# Patient Record
Sex: FEMALE | Race: WHITE
[De-identification: names, ages, dates, MRNs, and addresses within clinical notes are randomized per-mention and may not be internally consistent; named-entity substitution may affect disease eponyms.]

---

## 2017-11-20 ENCOUNTER — HOSPITAL ENCOUNTER (EMERGENCY)
Dept: HOSPITAL 47 - EC | Age: 22
Discharge: HOME | End: 2017-11-20
Payer: COMMERCIAL

## 2017-11-20 VITALS — DIASTOLIC BLOOD PRESSURE: 58 MMHG | HEART RATE: 75 BPM | SYSTOLIC BLOOD PRESSURE: 105 MMHG

## 2017-11-20 VITALS — RESPIRATION RATE: 18 BRPM | TEMPERATURE: 97.2 F

## 2017-11-20 DIAGNOSIS — Z79.899: ICD-10-CM

## 2017-11-20 DIAGNOSIS — G40.909: Primary | ICD-10-CM

## 2017-11-20 DIAGNOSIS — Z88.5: ICD-10-CM

## 2017-11-20 LAB
ANION GAP SERPL CALC-SCNC: 8 MMOL/L
BASOPHILS # BLD AUTO: 0 K/UL (ref 0–0.2)
BASOPHILS NFR BLD AUTO: 0 %
BUN SERPL-SCNC: 12 MG/DL (ref 7–17)
CALCIUM SPEC-MCNC: 9.5 MG/DL (ref 8.4–10.2)
CH: 32.2
CHCM: 33
CHLORIDE SERPL-SCNC: 108 MMOL/L (ref 98–107)
CO2 SERPL-SCNC: 21 MMOL/L (ref 22–30)
EOSINOPHIL # BLD AUTO: 0.2 K/UL (ref 0–0.7)
EOSINOPHIL NFR BLD AUTO: 2 %
ERYTHROCYTE [DISTWIDTH] IN BLOOD BY AUTOMATED COUNT: 3.72 M/UL (ref 3.8–5.4)
ERYTHROCYTE [DISTWIDTH] IN BLOOD: 11.8 % (ref 11.5–15.5)
GLUCOSE SERPL-MCNC: 72 MG/DL (ref 74–99)
HCT VFR BLD AUTO: 36.4 % (ref 34–46)
HDW: 2.21
HGB BLD-MCNC: 12.2 GM/DL (ref 11.4–16)
LUC NFR BLD AUTO: 1 %
LYMPHOCYTES # SPEC AUTO: 1.2 K/UL (ref 1–4.8)
LYMPHOCYTES NFR SPEC AUTO: 17 %
MCH RBC QN AUTO: 32.9 PG (ref 25–35)
MCHC RBC AUTO-ENTMCNC: 33.6 G/DL (ref 31–37)
MCV RBC AUTO: 98 FL (ref 80–100)
MONOCYTES # BLD AUTO: 0.5 K/UL (ref 0–1)
MONOCYTES NFR BLD AUTO: 6 %
NEUTROPHILS # BLD AUTO: 5.3 K/UL (ref 1.3–7.7)
NEUTROPHILS NFR BLD AUTO: 73 %
NON-AFRICAN AMERICAN GFR(MDRD): >60
PARTICLE COUNT: (no result)
PH UR: 7.5 [PH] (ref 5–8)
POTASSIUM SERPL-SCNC: 4.4 MMOL/L (ref 3.5–5.1)
SODIUM SERPL-SCNC: 137 MMOL/L (ref 137–145)
SP GR UR: 1.02 (ref 1–1.03)
SQUAMOUS UR QL AUTO: 1 /HPF (ref 0–4)
UA BILLING (MACRO VS. MICRO): (no result)
UROBILINOGEN UR QL STRIP: <2 MG/DL (ref ?–2)
WBC # BLD AUTO: 0.08 10*3/UL
WBC # BLD AUTO: 7.3 K/UL (ref 4–11)
WBC #/AREA URNS HPF: 2 /HPF (ref 0–5)
WBC (PEROX): 7.65

## 2017-11-20 PROCEDURE — 36415 COLL VENOUS BLD VENIPUNCTURE: CPT

## 2017-11-20 PROCEDURE — 51701 INSERT BLADDER CATHETER: CPT

## 2017-11-20 PROCEDURE — 71010: CPT

## 2017-11-20 PROCEDURE — 85025 COMPLETE CBC W/AUTO DIFF WBC: CPT

## 2017-11-20 PROCEDURE — 80048 BASIC METABOLIC PNL TOTAL CA: CPT

## 2017-11-20 PROCEDURE — 99284 EMERGENCY DEPT VISIT MOD MDM: CPT

## 2017-11-20 PROCEDURE — 81001 URINALYSIS AUTO W/SCOPE: CPT

## 2017-11-20 NOTE — ED
Seizure HPI





- General


Chief Complaint: Seizure


Stated Complaint: seizure


Time Seen by Provider: 11/20/17 21:34


Source: patient, EMS


Mode of arrival: EMS


Limitations: language barrier, altered mental status, physical limitation





- History of Present Illness


Initial Comments: 


patient is a 22-year-old female with a history of cerebral palsy and epilepsy 

who presents with a chief complaint of seizure.  The patient arrived via EMS 

with her mother.  Her mother's her primary caretaker.  The mother states that 

her and her  recently went hunting and the left by daughter with her 

aunt for respite.  They took her home today, and as she came in she began 

having a seizure.  Mother states that the patient had 2 seizures, total last 

about 6 minutes.  The patient was given rectal diazepam from the mother.  She 

was transported to the emergency department by EMS without further seizure 

activity.  Per the mother, the patient missed any doses of her medications 

which are Klonopin and Trileptal.  She has not been having any other complaints 

suggest an infectious etiology.  Mother does state however that when she went 

from home the patient does not sleep as well.








- Related Data


 Home Medications











 Medication  Instructions  Recorded  Confirmed


 


Diazepam [Diastat] 10 mg RECTAL ONCE PRN 11/20/17 11/20/17


 


OXcarbazepine 300MG/5ML SUSP 420 mg PEG/G-TUBE BID 11/20/17 11/20/17





[Trileptal Oral Susp]   


 


Prevacid Solutab 15mg 15 mg PEG/G-TUBE HS 11/20/17 11/20/17


 


Prevacid Solutab 15mg 30 mg PO AC-BRKFST 11/20/17 11/20/17


 


clonazePAM [Klonopin ODT Wafer] 0.25 mg PEG/G-TUBE BID@0700,1500 11/20/17 11/20/ 17


 


clonazePAM [Klonopin ODT Wafer] 0.5 mg PEG/G-TUBE HS 11/20/17 11/20/17











 Allergies











Allergy/AdvReac Type Severity Reaction Status Date / Time


 


morphine Allergy  Rash/Hives Verified 11/20/17 22:11














Review of Systems


ROS Statement: 


Those systems with pertinent positive or pertinent negative responses have been 

documented in the HPI.





ROS Other: All systems not noted in ROS Statement are negative.


Constitutional: Denies: fever


Respiratory: Denies: cough


Gastrointestinal: Denies: nausea, vomiting, diarrhea, constipation


Genitourinary: Denies: dysuria


Skin: Denies: rash, lesions





Past Medical History


Additional Past Medical History / Comment(s): cerebal palsy, epilepsy


History of Any Multi-Drug Resistant Organisms: None Reported


Additional Past Surgical History / Comment(s): muscle biopsies, peg tube


Past Psychological History: No Psychological Hx Reported


Smoking Status: Never smoker


Past Alcohol Use History: None Reported


Past Drug Use History: None Reported





General Exam


Limitations: language barrier, altered mental status, physical limitation


General appearance: alert, in no apparent distress





Course


 Vital Signs











  11/20/17





  21:23


 


Temperature 97.2 F L


 


Pulse Rate 114 H


 


Respiratory 18





Rate 


 


Blood Pressure 122/68


 


O2 Sat by Pulse 91 L





Oximetry 














Medical Decision Making





- Medical Decision Making


patient is a 22-year-old female with history of cerebral palsy and epilepsy who 

presents chief complaint of seizures.  As per the mother, the seizure lasted 

about 6 minutes, she got rectal diazepam and has since returned to baseline.  

There is no history of medical noncompliance, and no indication of infection.  

Patient has been out of her home environment for the past few days and has 

reportedly not slept well.  Basic labs, urine, and chest x-ray were ordered and 

reviewed.  Chest x-ray shows no acute process.  Urine does not show concern for 

infection.  Labwork is grossly unremarkable.  At this time, reviewed the 

results with the father who was in the room.  Patient has been stable in the 

emergency department.  They are instructed to follow with primary care and 

neurology.  At this time, patient is stable for discharge.








- Lab Data


Result diagrams: 


 11/20/17 21:48





 11/20/17 21:48


 Lab Results











  11/20/17 11/20/17 11/20/17 Range/Units





  21:48 21:48 22:00 


 


WBC   7.3   (4.0-11.0)  k/uL


 


RBC   3.72 L   (3.80-5.40)  m/uL


 


Hgb   12.2   (11.4-16.0)  gm/dL


 


Hct   36.4   (34.0-46.0)  %


 


MCV   98.0   (80.0-100.0)  fL


 


MCH   32.9   (25.0-35.0)  pg


 


MCHC   33.6   (31.0-37.0)  g/dL


 


RDW   11.8   (11.5-15.5)  %


 


Plt Count   201   (150-450)  k/uL


 


Neutrophils %   73   %


 


Lymphocytes %   17   %


 


Monocytes %   6   %


 


Eosinophils %   2   %


 


Basophils %   0   %


 


Neutrophils #   5.3   (1.3-7.7)  k/uL


 


Lymphocytes #   1.2   (1.0-4.8)  k/uL


 


Monocytes #   0.5   (0-1.0)  k/uL


 


Eosinophils #   0.2   (0-0.7)  k/uL


 


Basophils #   0.0   (0-0.2)  k/uL


 


Sodium  137    (137-145)  mmol/L


 


Potassium  4.4    (3.5-5.1)  mmol/L


 


Chloride  108 H    ()  mmol/L


 


Carbon Dioxide  21 L    (22-30)  mmol/L


 


Anion Gap  8    mmol/L


 


BUN  12    (7-17)  mg/dL


 


Creatinine  0.37 L    (0.52-1.04)  mg/dL


 


Est GFR (MDRD) Af Amer  >60    (>60 ml/min/1.73 sqM)  


 


Est GFR (MDRD) Non-Af  >60    (>60 ml/min/1.73 sqM)  


 


Glucose  72 L    (74-99)  mg/dL


 


Calcium  9.5    (8.4-10.2)  mg/dL


 


Urine Color    Yellow  


 


Urine Appearance    Turbid H  (Clear)  


 


Urine pH    7.5  (5.0-8.0)  


 


Ur Specific Gravity    1.017  (1.001-1.035)  


 


Urine Protein    Trace H  (Negative)  


 


Urine Glucose (UA)    Negative  (Negative)  


 


Urine Ketones    Negative  (Negative)  


 


Urine Blood    Negative  (Negative)  


 


Urine Nitrite    Negative  (Negative)  


 


Urine Bilirubin    Negative  (Negative)  


 


Urine Urobilinogen    <2.0  (<2.0)  mg/dL


 


Ur Leukocyte Esterase    Negative  (Negative)  


 


Urine WBC    2  (0-5)  /hpf


 


Ur Squamous Epith Cells    1  (0-4)  /hpf


 


Urine Bacteria    Many H  (None)  /hpf


 


Urine Yeast (Budding)    Few H  (None)  /hpf














Disposition


Clinical Impression: 


 Epileptic seizure





Disposition: HOME SELF-CARE


Condition: Good


Instructions:  Recurrent Seizures in Adults (ED)


Referrals: 


Carmen Gibbs MD [Primary Care Provider] - 1-2 days

## 2017-11-20 NOTE — XR
EXAMINATION TYPE: XR chest 1V

 

DATE OF EXAM: 11/20/2017

 

COMPARISON: 2/27/2013

 

HISTORY: Seizure. Chest pain

 

TECHNIQUE: Single frontal view of the chest is obtained.

 

FINDINGS:  There is no heart failure nor confluent pneumonic infiltrate. Costophrenic angles are nato
r. Bony thorax appears intact.

 

IMPRESSION:  No active cardiopulmonary disease. No change.

## 2018-12-03 ENCOUNTER — HOSPITAL ENCOUNTER (OUTPATIENT)
Dept: HOSPITAL 47 - RADXRYALE | Age: 23
End: 2018-12-03
Attending: INTERNAL MEDICINE
Payer: COMMERCIAL

## 2018-12-03 DIAGNOSIS — R05: Primary | ICD-10-CM

## 2018-12-03 PROCEDURE — 71046 X-RAY EXAM CHEST 2 VIEWS: CPT

## 2018-12-03 NOTE — XR
EXAMINATION TYPE: XR chest 2V

 

DATE OF EXAM: 12/3/2018

 

COMPARISON: Prior chest x-ray 11/20/2017

 

HISTORY: Cough

 

TECHNIQUE:  Frontal and lateral views of the chest are obtained.

 

FINDINGS:  Lung volumes are low. Cardiac mediastinal silhouette, pulmonary vascularity and tarik are s
table. No evident pneumothorax or pleural effusion. There is bronchial wall thickening present. No de
finite airspace disease.

 

IMPRESSION:  Expiratory exam. Correlate for bronchitis, reactive airways disease, follow-up as indica
wallace

## 2019-01-28 ENCOUNTER — HOSPITAL ENCOUNTER (OUTPATIENT)
Dept: HOSPITAL 47 - LABWHC1 | Age: 24
Discharge: HOME | End: 2019-01-28
Attending: PSYCHIATRY & NEUROLOGY
Payer: COMMERCIAL

## 2019-01-28 DIAGNOSIS — G40.909: ICD-10-CM

## 2019-01-28 DIAGNOSIS — G80.3: Primary | ICD-10-CM

## 2019-01-28 LAB
ALBUMIN SERPL-MCNC: 4.4 G/DL (ref 3.8–4.9)
ALBUMIN/GLOB SERPL: 1.69 G/DL (ref 1.2–2.1)
ALP SERPL-CCNC: 91 U/L (ref 41–126)
ALT SERPL-CCNC: 18 U/L (ref 8–44)
ANION GAP SERPL CALC-SCNC: 9.8 MMOL/L (ref 4–12)
AST SERPL-CCNC: 25 U/L (ref 13–35)
BUN SERPL-SCNC: 14 MG/DL (ref 9–27)
CALCIUM SPEC-MCNC: 9.6 MG/DL (ref 8.7–10.3)
CHLORIDE SERPL-SCNC: 96 MMOL/L (ref 96–109)
CO2 SERPL-SCNC: 30.2 MMOL/L (ref 21.6–31.8)
ERYTHROCYTE [DISTWIDTH] IN BLOOD BY AUTOMATED COUNT: 3.73 M/UL (ref 3.8–5.4)
ERYTHROCYTE [DISTWIDTH] IN BLOOD: 11.7 % (ref 11.5–15.5)
GLOBULIN SER CALC-MCNC: 2.6 G/DL (ref 1.6–3.3)
GLUCOSE SERPL-MCNC: 91 MG/DL (ref 70–110)
HCT VFR BLD AUTO: 36.5 % (ref 34–46)
HGB BLD-MCNC: 12.2 GM/DL (ref 11.4–16)
MCH RBC QN AUTO: 32.7 PG (ref 25–35)
MCHC RBC AUTO-ENTMCNC: 33.5 G/DL (ref 31–37)
MCV RBC AUTO: 97.8 FL (ref 80–100)
PLATELET # BLD AUTO: 158 K/UL (ref 150–450)
POTASSIUM SERPL-SCNC: 4.3 MMOL/L (ref 3.5–5.5)
PROT SERPL-MCNC: 7 G/DL (ref 6.2–8.2)
SODIUM SERPL-SCNC: 136 MMOL/L (ref 135–145)
WBC # BLD AUTO: 5.4 K/UL (ref 3.8–10.6)

## 2019-01-28 PROCEDURE — 36415 COLL VENOUS BLD VENIPUNCTURE: CPT

## 2019-01-28 PROCEDURE — 80053 COMPREHEN METABOLIC PANEL: CPT

## 2019-01-28 PROCEDURE — 80183 DRUG SCRN QUANT OXCARBAZEPIN: CPT

## 2019-01-28 PROCEDURE — 82306 VITAMIN D 25 HYDROXY: CPT

## 2019-01-28 PROCEDURE — 85027 COMPLETE CBC AUTOMATED: CPT

## 2021-11-23 ENCOUNTER — HOSPITAL ENCOUNTER (OUTPATIENT)
Dept: HOSPITAL 47 - LABWHC1 | Age: 26
Discharge: HOME | End: 2021-11-23
Attending: INTERNAL MEDICINE
Payer: COMMERCIAL

## 2021-11-23 DIAGNOSIS — Z20.822: Primary | ICD-10-CM

## 2021-11-23 DIAGNOSIS — R05.9: ICD-10-CM
